# Patient Record
Sex: FEMALE | Race: WHITE | NOT HISPANIC OR LATINO | Employment: FULL TIME | URBAN - METROPOLITAN AREA
[De-identification: names, ages, dates, MRNs, and addresses within clinical notes are randomized per-mention and may not be internally consistent; named-entity substitution may affect disease eponyms.]

---

## 2021-12-09 ENCOUNTER — TRANSFERRED RECORDS (OUTPATIENT)
Dept: HEALTH INFORMATION MANAGEMENT | Facility: CLINIC | Age: 36
End: 2021-12-09

## 2022-10-26 ENCOUNTER — TELEPHONE (OUTPATIENT)
Dept: ENDOCRINOLOGY | Facility: CLINIC | Age: 37
End: 2022-10-26

## 2022-11-01 ENCOUNTER — TELEPHONE (OUTPATIENT)
Dept: ENDOCRINOLOGY | Facility: CLINIC | Age: 37
End: 2022-11-01

## 2022-11-01 NOTE — TELEPHONE ENCOUNTER
"Patient interested in balloon. Read about it on [employer] and [employer] websites.  Ht 5'1\", 199 lbs, BMU 37.6.  Had gestational diabetes, pre-diabetes now. Snores and tired during the day, potential BRANDT.  Unsure about cost and self-pay. Has [employer].  Discussed sleeve gastrectomy and anti-obesity medication options.  Plan:  Patient to call Call Center and schedule the followin. New Bariatric Surgery Consult  2. New NBS Nurse Class ( NEW CONSULT WEIGHT LOSS SURGERY CLASS)  3. Dietitian Class ( WLS NUTRITION CLASS)  "

## 2022-11-22 RX ORDER — BETAMETHASONE VALERATE 1.2 MG/G
AEROSOL, FOAM TOPICAL
COMMUNITY
Start: 2022-05-27

## 2022-11-22 RX ORDER — LORAZEPAM 0.5 MG/1
1 TABLET ORAL EVERY 6 HOURS
COMMUNITY
Start: 2021-09-12

## 2022-11-22 RX ORDER — ACETAMINOPHEN 500 MG
1000 TABLET ORAL
COMMUNITY
Start: 2021-09-14 | End: 2022-11-23

## 2022-11-22 RX ORDER — IBUPROFEN 600 MG/1
600 TABLET, FILM COATED ORAL
COMMUNITY
Start: 2021-09-14 | End: 2022-11-23

## 2022-11-22 NOTE — PROGRESS NOTES
"Virtual Visit Check-In    During this virtual visit the patient is located in [place], patient verifies this as the location during the entirety of this visit.     Jane is a 37 year old who is being evaluated via a billable video visit.      How would you like to obtain your AVS? MyChart  If the video visit is dropped, the invitation should be resent by: Text to cell phone: [phone]  Will anyone else be joining your video visit? No        Video-Visit Details    Video Start Time: 9:59AM    Type of service:  Video Visit    Video End Time:11:06AM    Originating Location (pt. Location): Home        Distant Location (provider location):  Off-site    Platform used for Video Visit: [employer]       70 minutes spent on the date of the encounter doing chart review, history and exam, documentation and further activities per the note    New Bariatric Surgery Consultation Note    2022    RE: Jane Phan  MR#: [id number]  : 1985      Referring provider: No flowsheet data found.    Chief Complaint/Reason for visit: evaluation for possible weight loss surgery    Dear No primary care provider on file.,    I had the pleasure of seeing your patient, Jane Phan, to evaluate her obesity and consider her for possible weight loss surgery. As you know, Jane Phan is 37 year old.  She has a height of 5' 1\", a weight of 198 lbs 0 oz, and calculated Body mass index is 37.41 kg/m .    Assessment & Plan   Problem List Items Addressed This Visit        Digestive    Gastroesophageal reflux disease without esophagitis     Symptoms not well controlled on Omeprazole 20mg every day. Increased today to 20mg twice daily. Can consider increase dosing if still no relief.     EGD ordered.          Relevant Medications    omeprazole (PRILOSEC) 20 MG DR capsule    Other Relevant Orders    Adult GI  Referral - Procedure Only    Class 2 severe obesity with serious comorbidity and body mass index (BMI) of 37.0 to 37.9 in adult " (H)     Obesity onset in puberty. Gradual weight gain through pregnancies, stress and depression. Has been able to lose weight, but not sustain weight loss. Struggles most with increase hunger and cravings.     Weight today - 198lbs    Discussed AOMs:   - Phentermine - concern for history of pericarditis in pregnancy.   - Topiramate - can be considered in future  - Naltrexone - can be considered in future   - GLP-1 to be started today. Best fit for hunger control and new diagnosis of pre-diabetes. Reviewed side effects and benefits. Will start Saxenda. If not covered consider ozempic based on lab resultts.          Relevant Medications    liraglutide - Weight Management (SAXENDA) 18 MG/3ML pen    insulin pen needle (31G X 5 MM) 31G X 5 MM miscellaneous    Other Relevant Orders    Adult Sleep Eval & Management  Referral    CBC with platelets    Comprehensive metabolic panel    Hemoglobin A1c    Lipid panel reflex to direct LDL Fasting    Parathyroid Hormone Intact    Vitamin D Deficiency    Adult Cardiology Eval  Referral       Endocrine    Pre-diabetes    Relevant Medications    liraglutide - Weight Management (SAXENDA) 18 MG/3ML pen    insulin pen needle (31G X 5 MM) 31G X 5 MM miscellaneous   Other Visit Diagnoses     Snoring    -  Primary    Relevant Orders    Adult Sleep Eval & Management  Referral    History of pericarditis during pregnancy        Relevant Orders    Adult Cardiology Eval  Referral         1. Start Saxenda ramp up. Will consider ozempic after lab results. If needed due to coverage, topiramate.    2. Increase omeprazole to 20mg twice daily to help with GERD control   3. Labs have been faxed.   4. Schedule EGD with Dr. Willy Minor.   5. Letters of support/Clearance:    - PCP   - Cardiology - referral placed   - Sleep - referral placed   6. Schedule Psych Eval  7. Complete 3 dietitian visits, and then monthly until surgery   8. Dr. Minor visit in 2 months   9.  "Follow up with Margy Ham in 1 month   10. Reach out to Jose for insurance coverage confirmation    HISTORY OF PRESENT ILLNESS:  Weight Loss History Reviewed with Patient 11/22/2022   How long have you been overweight? Since puberty   What is the most that you have ever weighed? 202   What is the most weight you have lost? 35   I have tried the following methods to lose weight Watching portions or calories, Exercise, Weight Watchers, Atkins type diet (low carb/high protein), Pre packaged meals ex: Nutrisystem, OTC Medications   I have tried the following weight loss medications? (Check all that apply) None   Have you ever had weight loss surgery? No     Obesity onset in puberty. Has gained weight through increased stress, depression, and 3 pregnancies. Most weight she has lost is 35lbs, but hard to sustain weight loss. Tried vegan diet previously - felt increased energy but no weight loss. Felt best at 145lbs. Will have very painful menstral cycles, and lead to increase hunger. Has had friends and family go through surgery. Wants to go surgery for increased energy for children.      Weight today - 198lbs     Eating 3 meals a day, with 2 snacks. Snacks more through work. Feels constantly hungery. Has heartburn, will have bread to help with it. Can get full, but not stay full. Struggles with feeling satisfied. Craves salt. Puts \"butter on everything\".   Breakfast - breakfast sandwich, spaghetti-os  Lunch - sandwich, pre-packaged meals, left overs, snacks   Dinner - pasta, meat or potatoes, soup - will feed kids first, will not eat till 7:30PM   Snacks - sunflower seeds, radishes, chips, vegetables   Drinks - Monster energy drink, crystal light  ETOH - Truly seltzer nightly     Activity - Has been difficult. Has no motivation and increase fatigue. Struggles with activity with kids, will get tired easily with short of breath.     Tried OTC weight loss medications - but concerned it wasn't healthy.     CO-MORBIDITIES OF " OBESITY INCLUDE:     11/22/2022   I have the following health issues associated with obesity: Pre-Diabetes, GERD (Reflux)     Pre-DM - had gestational diabetes. Recent A1C showed pre-diabetes.     GERD - Symptoms daily of chest pain and coughing. Takes Prilosec 20mg once daily, relief during the day but not at night. No EGD. Denies vomiting, melena or blood in stool. Has had it 10+ years.     Headache - daily that can turn into migraine w/ aura. Takes Excedrin, bath and cold compress. Has tried medications in the past, but unsure what.     Pericarditis - when pregnant. But has no other concerns.     History of bleeding or clotting disorder? No    Is patient on biologics or immunomodulators? No    PAST MEDICAL HISTORY:  No past medical history on file.    PAST SURGICAL HISTORY:  No past surgical history on file.  Csection   Rhinoplasty   No other abdominal surgries.     FAMILY HISTORY:   No family history on file.    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 Yes   Who do you have in your support network that can be available to help you for the first 2 weeks after surgery? My , my in-laws, my mom, my kids   Who can you count on for support throughout your weight loss surgery journey? My , my in-laws, my mom, my kids   Can you afford 3 meals a day?  Yes   Can you afford 50-60 dollars a month for vitamins? Yes     Work as a city administer for town.   Very supportive family    HABITS:     11/22/2022   How often do you drink alcohol? 4 or more times a week   If you do drink alcohol, how many drinks might you have in a day? (one drink = 5 oz. wine, 1 can/bottle of beer, 1 shot liquor) 1 or 2   Have you ever used any of  the following nicotine products? No   Have you or are you currently using street drugs or prescription strength medication for which you do not have a prescription for? No   Do you have a history of chemical dependency (alcohol or drug abuse)? No     Currently taking narcotic/opioids No    PSYCHOLOGICAL HISTORY:   Psychological History Reviewed With Patient 11/22/2022   Have you ever attempted suicide? Never.   Have you had thoughts of suicide in the past year? No   Have you ever been hospitalized for mental illness or a suicide attempt? Never.   Do you have a history of chronic pain? Yes   Have you ever been diagnosed with fibromyalgia? No   Are you currently seeing a therapist or counselor?  No   Are you currently seeing a psychiatrist? No     Anxiety/depression - Mood has been stable. Not currently taking medications. Is seeing a  in December to help organize life.     ROS:     11/22/2022   Skin:  Skin fold rashes (groin or other folds)   HEENT: Headaches, Dizziness/lightheadedness   Musculoskeletal: Back pain   Cardiovascular: None of the above   Pulmonary: Shortness of breath with activity, Snoring, Experience morning headaches   Gastrointestinal: Heartburn, Reflux   Genitourinary: Stress incontinence (losing urine when coughing, sneezing, etc.)   Hematological: Family history of blood clots   Neurological: Migraine headaches   Female only: Excessive menstrual bleeding, Irregular menstrual cycles     Snoring, daytime drowsiness, does not wake up rested. No history of BRANDT.     EATING BEHAVIORS:     11/22/2022   Have you or anyone else thought that you had an eating disorder? No   Do you currently binge eat (eat a large amount of food in a short time)? No   Are you an emotional eater? Yes   Do you get up to eat after falling asleep? No       EXERCISE:     11/22/2022   How often do you exercise? Less than 1 time per week   What is the duration of your exercise (in minutes)? 20 Minutes   What types of  exercise do you do? walking   What keeps you from being more active?  Pain, Too tired       MEDICATIONS:  Current Outpatient Medications   Medication Sig Dispense Refill     aspirin-acetaminophen-caffeine (EXCEDRIN MIGRAINE) 250-250-65 MG tablet Take 1 tablet by mouth       betamethasone valerate 0.12 % FOAM APPLY TOPICALLY TWICE A DAY AS NEEDED FOR ITCHY SCALP; USE AFTER KETOCONAZOLE SHAMPOO       insulin pen needle (31G X 5 MM) 31G X 5 MM miscellaneous Use Saxenda pen needles daily or as directed. 100 each 1     liraglutide - Weight Management (SAXENDA) 18 MG/3ML pen Week 1: 0.6 mg subcutaneous daily, Week 2: 1.2 mg daily, Week 3: 1.8 mg daily, Week 4: 2.4 mg daily, Week 5 & on: 3 mg daily 15 mL 1     LORazepam (ATIVAN) 0.5 MG tablet Take 1 tablet by mouth every 6 hours       omeprazole (PRILOSEC) 20 MG DR capsule Take 1 capsule (20 mg) by mouth 2 times daily 60 capsule 3       ALLERGIES:  Allergies   Allergen Reactions     Doxycycline Rash     OHC Reaction: Rash; [facility] Noted: [id number]               Anti-obesity medication ROS:    HEENT  Hx of glaucoma: No    Cardiovascular  CAD:Yes  HTN:No    Gastrointestinal  GERD:Yes  Constipation:No  Liver Dz:No  H/O Pancreatitis:No    Psychiatric  Bipolar: No  Anxiety:Yes  Depression:Yes  History of alcohol/drug abuse: No  Hx of eating disorder:No    Endocrine  Personal or family hx of MTC or MEN2:No  Diabetes/prediabetes: Yes    Neurologic:  Hx of seizures: No  Hx of migraines: Yes  Memory Impairment: No      History of kidney stones: No  Kidney disease: No  Current birth control: Salpingectomy        Objective           Vitals:  No vitals were obtained today due to virtual visit.    Physical Exam         In summary, Jane Phan has Class III obesity with a body mass index of Body mass index is 37.41 kg/m . kg/m2 and the comorbidities stated above. She completed an informational seminar and is a possible candidate for the laparoscopic gastric sleeve.  She will have to  complete the following pre-requisites:    Received weight loss goal of 10 lb prior to surgery.  Achieve clearance from dietitian to see surgeon.  Have preoperative laboratory tests drawn.  Psychological Evaluation with MMPI and clearance for weight loss surgery.  Letter of clearance from the following PCP, cardiology, sleep.    Today in the office we discussed gastric sleeve surgery. Preoperative, perioperative, and postoperative processes, management, and follow up were addressed.  Risks and benefits were outlined including the risk of death, staple line leak (1-2%), PE, DVT, ulcer, worsening GERD, N/V, stricture, hernia, wound infection, weight regain, and vitamin deficiencies. I emphasized exercise and activity along with appropriate food choice as the main foundation for weight loss with surgery providing surgical reinforcement of this.  All questions were answered.  A goal sheet and support group handout were given to the patient.        If you have not already watched our online seminar please go to [email or web address]    Weight loss requirement: 10lbs prior to surgery. Will have final weight check 2-3 weeks prior to surgery at anesthesia or nurse pre-op teaching visit.    -Need current weight confirmation at primary clinic or weight management clinic Yes    Bariatric labs ordered, call for a lab only appointment at any [facility] lab. To find a lab location near you, please call [phone]. Please let us know if orders need to be faxed to a non [facility] lab.    Schedule bariatric psych eval as soon as possible.  List of psychologists will be sent to you via [employer] or given to you in clinic.     Call Jose Daley at [phone] to discuss insurance coverage for bariatric surgery.  Please check with your insurance regarding bariatric surgery coverage also. Jose can also help you with scheduling psych eval if you are having difficulties.    The following clearance letters are  needed: Letter templates will be sent to you via [employer] or given to you in clinic. Providers can submit through electronic medical record or fax to [phone].  - Letter of support from primary care provider.   - Sleep   - Cardiology     Smoking cessation and nicotine test needed: No    Birth control after surgery discussed. Patient instructed that 2 forms of birth control required after surgery and to avoid pregnancy for at least 18 months after surgery: Salpingectomy    NEXT VISITS: A  should reach out to you to schedule the following appointments.  If they do not reach you please call [phone] to schedule the following appointments:    -See dietitian in 1 month and monthly for 3 months    -See Margo Ham in 1 months to follow up on pre-op weight loss and weight loss medications    -See Dr Minro in 2 months for bariatric surgeon visit. Discuss bariatric surgery.  Discuss EGD results        Once the patient has completed the requirements in their task list and there are no further recommendations, the pt will be allowed to see the surgeon of their choice for consultation on the laparoscopic gastric sleeve surgery. Patient verbalizes understanding of the process to surgery and expectations for the postoperative period including the need for lifelong lifestyle changes, vitamin supplementation, and laboratory monitoring.    Sincerely,     MARGO RODRIGUEZ PA-C

## 2022-11-23 ENCOUNTER — VIRTUAL VISIT (OUTPATIENT)
Dept: ENDOCRINOLOGY | Facility: CLINIC | Age: 37
End: 2022-11-23
Payer: COMMERCIAL

## 2022-11-23 ENCOUNTER — TELEPHONE (OUTPATIENT)
Dept: ENDOCRINOLOGY | Facility: CLINIC | Age: 37
End: 2022-11-23

## 2022-11-23 VITALS — BODY MASS INDEX: 37.38 KG/M2 | WEIGHT: 198 LBS | HEIGHT: 61 IN

## 2022-11-23 DIAGNOSIS — Z87.59: ICD-10-CM

## 2022-11-23 DIAGNOSIS — E66.01 CLASS 2 SEVERE OBESITY WITH SERIOUS COMORBIDITY AND BODY MASS INDEX (BMI) OF 37.0 TO 37.9 IN ADULT, UNSPECIFIED OBESITY TYPE (H): ICD-10-CM

## 2022-11-23 DIAGNOSIS — R73.03 PRE-DIABETES: ICD-10-CM

## 2022-11-23 DIAGNOSIS — K21.9 GASTROESOPHAGEAL REFLUX DISEASE WITHOUT ESOPHAGITIS: ICD-10-CM

## 2022-11-23 DIAGNOSIS — R06.83 SNORING: Primary | ICD-10-CM

## 2022-11-23 DIAGNOSIS — Z86.79: ICD-10-CM

## 2022-11-23 DIAGNOSIS — E66.812 CLASS 2 SEVERE OBESITY WITH SERIOUS COMORBIDITY AND BODY MASS INDEX (BMI) OF 37.0 TO 37.9 IN ADULT, UNSPECIFIED OBESITY TYPE (H): ICD-10-CM

## 2022-11-23 PROBLEM — F41.8 DEPRESSION WITH ANXIETY: Status: ACTIVE | Noted: 2017-03-28

## 2022-11-23 PROBLEM — G43.009 MIGRAINE WITHOUT AURA: Status: ACTIVE | Noted: 2017-03-28

## 2022-11-23 PROBLEM — N83.209 OVARIAN CYST: Status: ACTIVE | Noted: 2022-11-23

## 2022-11-23 PROCEDURE — 99205 OFFICE O/P NEW HI 60 MIN: CPT | Mod: GT

## 2022-11-23 NOTE — TELEPHONE ENCOUNTER
"Prior Authorization Retail Medication Request    Medication/Dose: Saxenda    ICD code (if different than what is on RX):      Previously Tried and Failed: Watching portions or calories, Exercise, Weight Watchers, Atkins type diet (low carb/high protein), Pre packaged meals ex: Nutrisystem, OTC Medications    Rationale: Pre-Diabetes, GERD (Reflux)  Weight loss medication(s) are indicated due to patient having a BMI of at least 30 kg/m2     Estimated body mass index is 37.41 kg/m  as calculated from the following:    Height as of an earlier encounter on 11/23/22: 1.549 m (5' 1\").    Weight as of an earlier encounter on 11/23/22: 89.8 kg (198 lb).    Initial Weight: 198 lbs 0 oz    Insurance Name:    Insurance ID:        Pharmacy Information (if different than what is on RX)  Name: [id number] - [facility] [address]  Phone:  [phone]    "

## 2022-11-23 NOTE — LETTER
Date:November 25, 2022      Patient was self referred, no letter generated. Do not send.        [facility] Health Information

## 2022-11-23 NOTE — Clinical Note
NBS, sleeve, Dr. Minor.   Jose - I am unsure if she qualifies for insurance coverage. Can you call and confirm? I have put in a sleep referral to see if she has BRANDT.  She also needs an EGD. I have put in an order for Dr. Willy Minor. Can you just call them to schedule, or is it best to go through you?   Nani - please send LOS/Clearance, bariatric info, and psych eval. Thank you!

## 2022-11-23 NOTE — LETTER
"2022       RE: Jane Phan  [address]  [place] [address]     Dear Colleague,    Thank you for referring your patient, Jane Phan, to the [facility] [place] [facility]. Please see a copy of my visit note below.    Virtual Visit Check-In    During this virtual visit the patient is located in [place], patient verifies this as the location during the entirety of this visit.     Jane is a 37 year old who is being evaluated via a billable video visit.      How would you like to obtain your AVS? MyChart  If the video visit is dropped, the invitation should be resent by: Text to cell phone: [phone]  Will anyone else be joining your video visit? No        Video-Visit Details    Video Start Time: 9:59AM    Type of service:  Video Visit    Video End Time:11:06AM    Originating Location (pt. Location): Home        Distant Location (provider location):  Off-site    Platform used for Video Visit: GeoVantage       70 minutes spent on the date of the encounter doing chart review, history and exam, documentation and further activities per the note    New Bariatric Surgery Consultation Note    2022    RE: Jane Phan  MR#: [id number]  : 1985      Referring provider: No flowsheet data found.    Chief Complaint/Reason for visit: evaluation for possible weight loss surgery    Dear No primary care provider on file.,    I had the pleasure of seeing your patient, Jane Phan, to evaluate her obesity and consider her for possible weight loss surgery. As you know, Jane Phan is 37 year old.  She has a height of 5' 1\", a weight of 198 lbs 0 oz, and calculated Body mass index is 37.41 kg/m .    Assessment & Plan   Problem List Items Addressed This Visit        Digestive    Gastroesophageal reflux disease without esophagitis     Symptoms not well controlled on Omeprazole 20mg every day. Increased today to 20mg twice " daily. Can consider increase dosing if still no relief.     EGD ordered.          Relevant Medications    omeprazole (PRILOSEC) 20 MG DR capsule    Other Relevant Orders    Adult GI  Referral - Procedure Only    Class 2 severe obesity with serious comorbidity and body mass index (BMI) of 37.0 to 37.9 in adult (H)     Obesity onset in puberty. Gradual weight gain through pregnancies, stress and depression. Has been able to lose weight, but not sustain weight loss. Struggles most with increase hunger and cravings.     Weight today - 198lbs    Discussed AOMs:   - Phentermine - concern for history of pericarditis in pregnancy.   - Topiramate - can be considered in future  - Naltrexone - can be considered in future   - GLP-1 to be started today. Best fit for hunger control and new diagnosis of pre-diabetes. Reviewed side effects and benefits. Will start Saxenda. If not covered consider ozempic based on lab resultts.          Relevant Medications    liraglutide - Weight Management (SAXENDA) 18 MG/3ML pen    insulin pen needle (31G X 5 MM) 31G X 5 MM miscellaneous    Other Relevant Orders    Adult Sleep Eval & Management  Referral    CBC with platelets    Comprehensive metabolic panel    Hemoglobin A1c    Lipid panel reflex to direct LDL Fasting    Parathyroid Hormone Intact    Vitamin D Deficiency    Adult Cardiology Eval  Referral       Endocrine    Pre-diabetes    Relevant Medications    liraglutide - Weight Management (SAXENDA) 18 MG/3ML pen    insulin pen needle (31G X 5 MM) 31G X 5 MM miscellaneous   Other Visit Diagnoses     Snoring    -  Primary    Relevant Orders    Adult Sleep Eval & Management  Referral    History of pericarditis during pregnancy        Relevant Orders    Adult Cardiology Eval  Referral         1. Start Saxenda ramp up. Will consider ozempic after lab results. If needed due to coverage, topiramate.    2. Increase omeprazole to 20mg twice daily to help  "with GERD control   3. Labs have been faxed.   4. Schedule EGD with Dr. Willy Minor.   5. Letters of support/Clearance:    - PCP   - Cardiology - referral placed   - Sleep - referral placed   6. Schedule Psych Eval  7. Complete 3 dietitian visits, and then monthly until surgery   8. Dr. Minor visit in 2 months   9. Follow up with Margy Ham in 1 month   10. Reach out to [facility] for insurance coverage confirmation    HISTORY OF PRESENT ILLNESS:  Weight Loss History Reviewed with Patient 11/22/2022   How long have you been overweight? Since puberty   What is the most that you have ever weighed? 202   What is the most weight you have lost? 35   I have tried the following methods to lose weight Watching portions or calories, Exercise, Weight Watchers, Atkins type diet (low carb/high protein), Pre packaged meals ex: Nutrisystem, OTC Medications   I have tried the following weight loss medications? (Check all that apply) None   Have you ever had weight loss surgery? No     Obesity onset in puberty. Has gained weight through increased stress, depression, and 3 pregnancies. Most weight she has lost is 35lbs, but hard to sustain weight loss. Tried vegan diet previously - felt increased energy but no weight loss. Felt best at 145lbs. Will have very painful menstral cycles, and lead to increase hunger. Has had friends and family go through surgery. Wants to go surgery for increased energy for children.      Weight today - 198lbs     Eating 3 meals a day, with 2 snacks. Snacks more through work. Feels constantly hungery. Has heartburn, will have bread to help with it. Can get full, but not stay full. Struggles with feeling satisfied. Craves salt. Puts \"butter on everything\".   Breakfast - breakfast sandwich, spaghetti-os  Lunch - sandwich, pre-packaged meals, left overs, snacks   Dinner - pasta, meat or potatoes, soup - will feed kids first, will not eat till 7:30PM   Snacks - sunflower seeds, radishes, chips, vegetables   Drinks - " Monster energy drink, crystal light  ETOH - Truly seltzer nightly     Activity - Has been difficult. Has no motivation and increase fatigue. Struggles with activity with kids, will get tired easily with short of breath.     Tried OTC weight loss medications - but concerned it wasn't healthy.     CO-MORBIDITIES OF OBESITY INCLUDE:     11/22/2022   I have the following health issues associated with obesity: Pre-Diabetes, GERD (Reflux)     Pre-DM - had gestational diabetes. Recent A1C showed pre-diabetes.     GERD - Symptoms daily of chest pain and coughing. Takes Prilosec 20mg once daily, relief during the day but not at night. No EGD. Denies vomiting, melena or blood in stool. Has had it 10+ years.     Headache - daily that can turn into migraine w/ aura. Takes Excedrin, bath and cold compress. Has tried medications in the past, but unsure what.     Pericarditis - when pregnant. But has no other concerns.     History of bleeding or clotting disorder? No    Is patient on biologics or immunomodulators? No    PAST MEDICAL HISTORY:  No past medical history on file.    PAST SURGICAL HISTORY:  No past surgical history on file.  Csection   Rhinoplasty   No other abdominal surgries.     FAMILY HISTORY:   No family history on file.    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 Yes   Who do you have in your support network that can be available to help you for the first 2 weeks after surgery? My , my in-laws, my mom, my kids   Who can you count on for support throughout your weight loss surgery journey? My , my in-laws, my mom, my kids   Can you afford 3 meals a day?  Yes   Can you afford 50-60 dollars a month for vitamins? Yes      Work as a city administer for town.   Very supportive family    HABITS:     11/22/2022   How often do you drink alcohol? 4 or more times a week   If you do drink alcohol, how many drinks might you have in a day? (one drink = 5 oz. wine, 1 can/bottle of beer, 1 shot liquor) 1 or 2   Have you ever used any of the following nicotine products? No   Have you or are you currently using street drugs or prescription strength medication for which you do not have a prescription for? No   Do you have a history of chemical dependency (alcohol or drug abuse)? No     Currently taking narcotic/opioids No    PSYCHOLOGICAL HISTORY:   Psychological History Reviewed With Patient 11/22/2022   Have you ever attempted suicide? Never.   Have you had thoughts of suicide in the past year? No   Have you ever been hospitalized for mental illness or a suicide attempt? Never.   Do you have a history of chronic pain? Yes   Have you ever been diagnosed with fibromyalgia? No   Are you currently seeing a therapist or counselor?  No   Are you currently seeing a psychiatrist? No     Anxiety/depression - Mood has been stable. Not currently taking medications. Is seeing a  in December to help organize life.     ROS:     11/22/2022   Skin:  Skin fold rashes (groin or other folds)   HEENT: Headaches, Dizziness/lightheadedness   Musculoskeletal: Back pain   Cardiovascular: None of the above   Pulmonary: Shortness of breath with activity, Snoring, Experience morning headaches   Gastrointestinal: Heartburn, Reflux   Genitourinary: Stress incontinence (losing urine when coughing, sneezing, etc.)   Hematological: Family history of blood clots   Neurological: Migraine headaches   Female only: Excessive menstrual bleeding, Irregular menstrual cycles     Snoring, daytime drowsiness, does not wake up rested. No history of BRANDT.     EATING BEHAVIORS:     11/22/2022   Have you or anyone else thought that you had an eating disorder? No   Do you  currently binge eat (eat a large amount of food in a short time)? No   Are you an emotional eater? Yes   Do you get up to eat after falling asleep? No       EXERCISE:     11/22/2022   How often do you exercise? Less than 1 time per week   What is the duration of your exercise (in minutes)? 20 Minutes   What types of exercise do you do? walking   What keeps you from being more active?  Pain, Too tired       MEDICATIONS:  Current Outpatient Medications   Medication Sig Dispense Refill     aspirin-acetaminophen-caffeine (EXCEDRIN MIGRAINE) 250-250-65 MG tablet Take 1 tablet by mouth       betamethasone valerate 0.12 % FOAM APPLY TOPICALLY TWICE A DAY AS NEEDED FOR ITCHY SCALP; USE AFTER KETOCONAZOLE SHAMPOO       insulin pen needle (31G X 5 MM) 31G X 5 MM miscellaneous Use Saxenda pen needles daily or as directed. 100 each 1     liraglutide - Weight Management (SAXENDA) 18 MG/3ML pen Week 1: 0.6 mg subcutaneous daily, Week 2: 1.2 mg daily, Week 3: 1.8 mg daily, Week 4: 2.4 mg daily, Week 5 & on: 3 mg daily 15 mL 1     LORazepam (ATIVAN) 0.5 MG tablet Take 1 tablet by mouth every 6 hours       omeprazole (PRILOSEC) 20 MG DR capsule Take 1 capsule (20 mg) by mouth 2 times daily 60 capsule 3       ALLERGIES:  Allergies   Allergen Reactions     Doxycycline Rash     OHC Reaction: Rash; [facility] Noted: [id number]               Anti-obesity medication ROS:    HEENT  Hx of glaucoma: No    Cardiovascular  CAD:Yes  HTN:No    Gastrointestinal  GERD:Yes  Constipation:No  Liver Dz:No  H/O Pancreatitis:No    Psychiatric  Bipolar: No  Anxiety:Yes  Depression:Yes  History of alcohol/drug abuse: No  Hx of eating disorder:No    Endocrine  Personal or family hx of MTC or MEN2:No  Diabetes/prediabetes: Yes    Neurologic:  Hx of seizures: No  Hx of migraines: Yes  Memory Impairment: No      History of kidney stones: No  Kidney disease: No  Current birth control: Salpingectomy        Objective            Vitals:  No vitals were obtained today  due to virtual visit.    Physical Exam         In summary, Jane Phan has Class III obesity with a body mass index of Body mass index is 37.41 kg/m . kg/m2 and the comorbidities stated above. She completed an informational seminar and is a possible candidate for the laparoscopic gastric sleeve.  She will have to complete the following pre-requisites:    Received weight loss goal of 10 lb prior to surgery.  Achieve clearance from dietitian to see surgeon.  Have preoperative laboratory tests drawn.  Psychological Evaluation with MMPI and clearance for weight loss surgery.  Letter of clearance from the following PCP, cardiology, sleep.    Today in the office we discussed gastric sleeve surgery. Preoperative, perioperative, and postoperative processes, management, and follow up were addressed.  Risks and benefits were outlined including the risk of death, staple line leak (1-2%), PE, DVT, ulcer, worsening GERD, N/V, stricture, hernia, wound infection, weight regain, and vitamin deficiencies. I emphasized exercise and activity along with appropriate food choice as the main foundation for weight loss with surgery providing surgical reinforcement of this.  All questions were answered.  A goal sheet and support group handout were given to the patient.        If you have not already watched our online seminar please go to [email or web address]    Weight loss requirement: 10lbs prior to surgery. Will have final weight check 2-3 weeks prior to surgery at anesthesia or nurse pre-op teaching visit.    -Need current weight confirmation at primary clinic or weight management clinic Yes    Bariatric labs ordered, call for a lab only appointment at any [facility] lab. To find a lab location near you, please call [phone]. Please let us know if orders need to be faxed to a non [facility] lab.    Schedule bariatric psych eval as soon as possible.  List of psychologists will be sent to you via [employer]  or given to you in clinic.     Call Jose Daley at [phone] to discuss insurance coverage for bariatric surgery.  Please check with your insurance regarding bariatric surgery coverage also. Jose can also help you with scheduling psych eval if you are having difficulties.    The following clearance letters are needed: Letter templates will be sent to you via [employer] or given to you in clinic. Providers can submit through electronic medical record or fax to [phone].  - Letter of support from primary care provider.   - Sleep   - Cardiology     Smoking cessation and nicotine test needed: No    Birth control after surgery discussed. Patient instructed that 2 forms of birth control required after surgery and to avoid pregnancy for at least 18 months after surgery: Salpingectomy    NEXT VISITS: A  should reach out to you to schedule the following appointments.  If they do not reach you please call [phone] to schedule the following appointments:    -See dietitian in 1 month and monthly for 3 months    -See Margo Ham in 1 months to follow up on pre-op weight loss and weight loss medications    -See Dr Minor in 2 months for bariatric surgeon visit. Discuss bariatric surgery.  Discuss EGD results        Once the patient has completed the requirements in their task list and there are no further recommendations, the pt will be allowed to see the surgeon of their choice for consultation on the laparoscopic gastric sleeve surgery. Patient verbalizes understanding of the process to surgery and expectations for the postoperative period including the need for lifelong lifestyle changes, vitamin supplementation, and laboratory monitoring.    Sincerely,     MARGO RODRIGUEZ PA-C        Faxed lab orders to [facility], fax number: [phone].    Nani Bates, EMT        Again, thank you for allowing me to participate in the care of your patient.      Sincerely,    MARGO RODRIGUEZ PA-C

## 2022-11-24 NOTE — ASSESSMENT & PLAN NOTE
Obesity onset in puberty. Gradual weight gain through pregnancies, stress and depression. Has been able to lose weight, but not sustain weight loss. Struggles most with increase hunger and cravings.     Weight today - 198lbs    Discussed AOMs:   - Phentermine - concern for history of pericarditis in pregnancy.   - Topiramate - can be considered in future  - Naltrexone - can be considered in future   - GLP-1 to be started today. Best fit for hunger control and new diagnosis of pre-diabetes. Reviewed side effects and benefits. Will start Saxenda. If not covered consider ozempic based on lab resultts.

## 2022-11-24 NOTE — PATIENT INSTRUCTIONS
"Thank you for allowing us the privilege of caring for you. We hope we provided you with the excellent service you deserve.   Please let us know if there is anything else we can do for you so that we can be sure you are completely satisfied with your care experience.    To ensure the quality of our services you may be receiving a patient satisfaction survey from an independent patient satisfaction monitoring company.    The greatest compliment you can give is a \"Likely to Recommend\"    Your visit was with MARGO RODRIGUEZ PA-C today.    Instructions per today's visit:     Matthew Phan, it was great to visit with you today.  Here is a review of our visit.  If our clinic scheduler is not able to reach you please call [phone] to schedule your next appointments.    Start Saxenda. If not covered, will consider Ozempic based on labs or Topiramate.     EGD has bee ordered with Dr. Willy Minor. Endoscopy Instructions:Call the Endoscopy Department at [phone] to schedule.    Weight loss requirement: 10lbs prior to surgery. Will have final weight check 2-3 weeks prior to surgery at anesthesia or nurse pre-op teaching visit.    -Need current weight confirmation at primary clinic or weight management clinic Yes    Bariatric labs ordered, call for a lab only appointment at any [facility] lab. To find a lab location near you, please call [phone]. Please let us know if orders need to be faxed to a non [facility] lab.    Schedule bariatric psych eval as soon as possible.  List of psychologists will be sent to you via [employer] or given to you in clinic.     Call Jose Daley at [phone] to discuss insurance coverage for bariatric surgery.  Please check with your insurance regarding bariatric surgery coverage also. Jose can also help you with scheduling psych eval if you are having difficulties.    The following clearance letters are needed: Letter templates will be sent to you via [employer] or given to you " in clinic. Providers can submit through electronic medical record or fax to [phone].  - Letter of support from primary care provider.   - Sleep   - Cardiology     Smoking cessation and nicotine test needed: No    Birth control after surgery discussed. Patient instructed that 2 forms of birth control required after surgery and to avoid pregnancy for at least 18 months after surgery: Salpingectomy    NEXT VISITS: A  should reach out to you to schedule the following appointments.  If they do not reach you please call [phone] to schedule the following appointments:    -See dietitian in 1 month and monthly for 3 months    -See Margy Ham in 1 months to follow up on pre-op weight loss and weight loss medications    -See Dr Minor in 2 months for bariatric surgeon visit. Discuss bariatric surgery.  Discuss EGD results      Information about Video Visits with [employer]: video visit information  _________________________________________________________________________________________________________________________________________________________  If you are asked by your clinic team to have your blood pressure checked:  [facility] do offer several locations for blood pressure checks. Please follow the below link to schedule an appointment. Scheduling an appointment at the pharmacy for a blood pressure check is now preferred.    Appointment Plus (appointment-plus.Kingspan Wind)  _________________________________________________________________________________________________________________________________________________________  Important contact and scheduling information:  Please call our contact center at [phone] to schedule your next appointments.  To find a lab location near you, please call [phone].  For any nursing questions or concerns call Madeline Hinds LPN at [phone] or Jane Nunn RN at [phone]  Please call during clinic hours Monday through Friday 8:00a - 4:00p if you  have questions or you can contact us via Kiddyt at anytime and we will reply during clinic hours.    Lab results will be communicated through My Chart or letter (if My Chart not used). Please call the clinic if you have not received communication after 1 week or if you have any questions.?  Clinic Fax: [phone]    _________________________________________________________________________________________________________________________________________________________  Meal Replacement Products:    Here is the link to our new e-store where you can purchase our meal replacement products    [employer] E-Store  GliaCure/store    The one week starter kit is a great way to sample a variety of products and see what works for you.    If you want more information about the product go to: Fresh Steps Meals  Conductrics.BIOCUREX    If you are an employee or [facility] or  [employer] please contact your care team for a 10% estore discount    Free Shipping for orders over $75     Benefits of meal replacements products:    Portion and calorie control  Improved nutrition  Structured eating  Simplified food choices  Avoid contact with trigger foods  _________________________________________________________________________________________________________________________________________________________  Interested in working with a health ?  Health coaches work with you to improve your overall health and wellbeing.  They look at the whole person, and may involve discussion of different areas of life, including, but not limited to the four pillars of health (sleep, exercise, nutrition, and stress management). Discuss with your care team if you would like to start working a health .  Health Coaching-3 Pack: Schedule by calling [phone]    $99 for three health coaching visits    Visits may be done in person or via phone    Coaching is a partnership between the  and the  client; Coaches do not prescribe or diagnose    Coaching helps inspire the client to reach his/her personal goals   _________________________________________________________________________________________________________________________________________________________  24 Week Healthy Lifestyle Plan:    Our mission in the 24-week Healthy Lifestyle Plan is to provide you with individualized care by giving you the tools, education and support you need to lose weight and maintain a healthy lifestyle. In your 24-week journey, you ll be supported by a dedicated weight loss team that includes registered dietitians, medical weight management providers, health coaches, and nurses -- all with special expertise in weight loss -- to help you every step of the way.     Monthly meetings with your registered dietician or medical weight management provider help to review your progress, update your care plan, and make any adjustments needed to ensure success. Between these visits, weekly and bi-weekly health  visits will help you focus on the four pillars of weight loss -- stress, sleep, nutrition, and exercise -- and how you can best adapt each to achieve sustainable weight loss results.    In addition, you will be given exclusive access to online wellbeing classes through [employer].  Your initial visit will be with a medical weight management provider who will help to understand your weight loss goals and ensure this program is the right fit for you. Please let our team know if you are interested in the 24 week plan by sending a message to your care team or calling [phone] to schedule.  _________________________________________________________________________________________________________________________________________________________    COMPREHENSIVE WEIGHT MANAGEMENT PROGRAM  VIRTUAL SUPPORT GROUPS    For Support Group Information:      We offer support groups for patients who are working on weight loss and  "considering, preparing for or have had weight loss surgery.   There is no cost for this opportunity.  You are invited to attend the?Virtual Support Groups?provided by any of the following locations:    [facility] via Microsoft Teams with Giulia Ledezma RN  2.   [place] via [place] [address] [place] with Grover Almanzar, PhD, LP  3.   [place] via [place] [address] with Monse Freed RN  4.   [facility] via Humanoid Teams with Monse Stewart Replaced by [facility]    The following Support Group information can also be found on our website:  [email or web address]    [facility] Weight Loss Surgery Support Group    [facility] Weight Loss Surgery Support Group  The support group is a patient-lead forum that meets monthly to share experiences, encouragement and education. It is open to those who have had weight loss surgery, are scheduled for surgery, and those who are considering surgery.   WHEN: This group meets on the 3rd Wednesday of each month from 5:00PM - 6:00PM virtually using Microsoft Teams.   FACILITATOR: Led by Giulia Ledezma RD, LD, RN, the program's Clinical Coordinator.   TO REGISTER: Please contact the clinic via Tello or call the nurse line directly at [phone] to inform our staff that you would like an invite sent to you and to let us know the email you would like the invite sent to. Prior to the meeting, a link with directions on how to join the meeting will be sent to you.    2022 Meetings  Kim 15: \"Let's Talk\" a time for the group to share.  July 20: \"Let's Talk\" a time for the group to share.  August 17: \"Let's Talk\" a time for the group to share.  September 21: \"Let's Talk\" a time for the group to share.  October 19: Guest Speaker: Dr Mukund Cristina MD Pulmonologist and Sleep Medicine Physician, \"Getting a Good Night's Sleep\".  November 16: \"Let's Talk\" a time for the group to share.  December 21: \"Let's Talk\" a time for the group to " "share.    [facility] Support Groups    Connections: Bariatric Care Support Group?  This is open to all [facility] (and those external to this program) pre- and post- operative bariatric surgery patients as well as their support system.   WHEN: This group meets the 2nd Tuesday of each month from 6:30 PM - 8:00 PM virtually using Microsoft Teams.   FACILITATOR: Led by Grover Almanzar, Ph.D who is a Licensed Psychologist with the [facility] Comprehensive Weight Management Program.   TO REGISTER: Please send an email to Grover Almanzar, Ph.D.,  at?[email or web address]?if you would like an invitation to the group and to learn about using Microsoft Teams.    2022 Meetings June 14: Giselle Carmona RD, LD at [facility], \"Nutritional Labeling\"  July 12 August 2 (Please Note Date Change)  September 13 October 11 November 8 December 13    Connections: Post-Operative Bariatric Surgery Support Group  This is a support group for [facility] bariatric patients (and those external to [facility]) who have had bariatric surgery and are at least 3 months post-surgery.  WHEN: This support group meets the 4th Wednesday of the month from 11:00 AM - 12:00 PM virtually using Microsoft Teams.   FACILITATOR: Led by Certified Bariatric Nurse, Monse Freed RN.   TO REGISTER: Please send an email to Monse at [email or web address] if you would like an invitation to the group and to learn about using Microsoft Teams.    2022 Meetings June 22 July 27 August 24 September 28 October 26 November 23 December 28      [facility] Healthy Lifestyle Virtual Support Group    Healthy Lifestyle Virtual Support Group?  This is 60 minutes of small group guided discussion, support and resources. All are welcome who want a healthy lifestyle.  WHEN: This group meets monthly on a Friday from 12:30 PM - 1:30 PM virtually " "using Microsoft Teams.   FACILITATOR: Led by National Board Certified Health and , Monse Stewart [facility].   TO REGISTER: Please send an email to Monse [email or web address] to receive monthly invites to the group or if you have any questions about having a health .  Prior to the meeting, a link with directions on how to join the meeting will be sent to you.    2022 Meetings  June 24: Monse Stewart [facility], \"Setting Limits and Boundaries\".  Jul 29: Open Forum  August 26: Guest Speaker: Marlen Gomez Registered Dietitian  September 30: Open Forum  October 28th: Guest Speaker: Pooja Baca [facility], Health , \"Gratitude Practices\".  November 18: Guest Speaker: Kaycee Rizvi RD Registered Dietitian, \"Navigating How to Eat around the Holidays\".  December 16: Guest Speaker: Jeanie Charlton [facility], \"Changing Your Relationship with Movement\".    ____________________________________________________________________________________________________________________________________________________________________________  [facility] Get Moving Program  Our team of physical therapists is trained to help you understand and take control of your condition. They will perform a thorough evaluation to determine your ability for activity and develop a customized plan to fit your goals and physical ability.  Scheduling: Unsure if the Get Moving program is right for you? Discuss the program with your medical provider or diabetes educator. You can also call us at [phone] to ask questions or schedule an appointment.   [facility] Get Moving Program  ____________________________________________________________________________________________________________________________________________________________________________  [facility] Diabetes Prevention Program (DPP)  If you have prediabetes and Medicare please contact us via MyChart to learn more about the Diabetes Prevention Program " (DPP)  Program Details:  [facility] offers the year-long Diabetes Prevention Program (DPP). The program helps you to make lifestyle changes that prevent or delay type 2 diabetes by supporting healthy eating, increased physical activity, stress reduction and use of coping skills.   On average, previous [facility] DPP cohorts have lost and maintained at least 5% of their starting weight throughout the program and averaged more than 150 minutes of physical activity per week.  Participants meet weekly for one-hour group sessions over sixteen weeks, every other week for the next 8 weeks, and monthly for the last six months.   A year-long maintenance program is also available for participants who complete the first year.   Location & Cost:   During the COVID-19 Public Health Emergency, the program is offered virtually. When in-person classes can resume, they will be held at [facility].  For people with Medicare, the program is covered in full. A self-pay option will also be available for those with non-Medicare insurance plans.   _________________________________________________________________________________________________________________________________________________________  Bluetooth Scale:    We hope to provide you with high quality virtual healthcare visits while social distancing for COVID-19 is necessary, as well as in the future when virtual visits may be more convenient for you.     Our technology team made it possible for Bluetooth scales to send weight measurements to our electronic medical record. This allows weights from you weighing at home to securely flow into the medical record, which will improve telephone and virtual visits.   Additionally, studies have shown that adults actually lose more weight when their weights are automatically sent to someone else, and also that this process is not stressful for those adults.    Below is a link for purchasing the  scale, with a discount code for our patients. You may call your insurance company to see if they will reimburse you for the cost of the scale, as a piece of durable medical equipment. The scales only go up to a weight of 400 pounds. This is an issue and we are working with the developer on increasing this. We found no scales that go over 400lb that have blue-tooth for connecting to [employer].    Scale to purchase: the [employer]  Body  Scale: [email or web address].ds    Discount Code: We have a discount code for our patients to bring the cost down to $50, Discount code is: [place]_Scale_20%off  _______________________________________________________________________________________________________________________________________________________________________________    To work with a Behavioral Health Psychologist:    Call to schedule:    Shawn Bella - [phone]  Fatimah Parikh - [phone]  Elizabet Hill - [phone]  Camilla Castle - [phone]   Katelynn Monge PhD (cannot accept Medicare) [phone]        Thank you,   [facility] Comprehensive Weight Management Team

## 2022-11-24 NOTE — ASSESSMENT & PLAN NOTE
Symptoms not well controlled on Omeprazole 20mg every day. Increased today to 20mg twice daily. Can consider increase dosing if still no relief.     EGD ordered.

## 2022-11-25 ENCOUNTER — VIRTUAL VISIT (OUTPATIENT)
Dept: ENDOCRINOLOGY | Facility: CLINIC | Age: 37
End: 2022-11-25
Payer: COMMERCIAL

## 2022-11-25 ENCOUNTER — CARE COORDINATION (OUTPATIENT)
Dept: ENDOCRINOLOGY | Facility: CLINIC | Age: 37
End: 2022-11-25

## 2022-11-25 ENCOUNTER — DOCUMENTATION ONLY (OUTPATIENT)
Dept: ENDOCRINOLOGY | Facility: CLINIC | Age: 37
End: 2022-11-25

## 2022-11-25 DIAGNOSIS — Z71.3 NUTRITIONAL COUNSELING: Primary | ICD-10-CM

## 2022-11-25 DIAGNOSIS — E66.9 OBESITY: ICD-10-CM

## 2022-11-25 PROCEDURE — 97802 MEDICAL NUTRITION INDIV IN: CPT | Mod: GT | Performed by: DIETITIAN, REGISTERED

## 2022-11-25 NOTE — PROGRESS NOTES
"aJne Phan is a 37 year old female who is being evaluated via a billable video visit.      The patient has been notified of following:     \"This video visit will be conducted via a call between you and your physician/provider. We have found that certain health care needs can be provided without the need for an in-person physical exam.  This service lets us provide the care you need with a video conversation.  If a prescription is necessary we can send it directly to your pharmacy.  If lab work is needed we can place an order for that and you can then stop by our lab to have the test done at a later time.    Video visits are billed at different rates depending on your insurance coverage.  Please reach out to your insurance provider with any questions.    If during the course of the call the physician/provider feels a video visit is not appropriate, you will not be charged for this service.\"    Patient has given verbal consent for Video visit? Yes  How would you like to obtain your AVS? MyChart  If you are dropped from the video visit, the video invite should be resent to: Text to cell phone: [phone]  Will anyone else be joining your video visit? No  {If patient encounters technical issues they should call [phone]      Video-Visit Details    Type of service:  Video Visit    Video Start Time: 9:00 AM  Video End Time: 9:16 AM    Originating Location (pt. Location): Home    Distant Location (provider location):  Offsite (providers home)      Platform used for Video Visit: Ynsect    During this virtual visit the patient is located in [place], patient verifies this as the location during the entirety of this visit.     New Bariatric Nutrition Consultation Note    Reason For Visit: Nutrition Assessment    Jane Phan is a 37 year old presenting today for new bariatric nutrition consult.   Pt is interested in laparoscopic sleeve gastrectomy with Dr. Escobar expected surgery in [place].  Patient is accompanied by self.  " "This is pt's first of 3 required nutrition visits prior to surgery.     Pt referred by RUPERTO Bill on November 23, 2022.  CO-MORBIDITIES OF OBESITY INCLUDE:       11/22/2022   I have the following health issues associated with obesity: Pre-Diabetes, GERD (Reflux)       SUPPORT:  Support System Reviewed With Patient 11/22/2022   Who do you have in your support network that can be available to help you for the first 2 weeks after surgery? My , my in-laws, my mom, my kids   Who can you count on for support throughout your weight loss surgery journey? My , my in-laws, my mom, my kids       ANTHROPOMETRICS:  Estimated body mass index is 37.41 kg/m  as calculated from the following:    Height as of 11/23/22: 1.549 m (5' 1\").    Weight as of 11/23/22: 89.8 kg (198 lb).     Current weight: 198 lbs     Required weight loss goal pre-op: -10 lbs from initial consult weight (goal weight 188 lbs or less before surgery)       11/22/2022   I have tried the following methods to lose weight Watching portions or calories, Exercise, Weight Watchers, Atkins type diet (low carb/high protein), Pre packaged meals ex: Nutrisystem, OTC Medications       Weight Loss Questions Reviewed With Patient 11/22/2022   How long have you been overweight? Since puberty       SUPPLEMENT INFORMATION:  none    NUTRITION HISTORY:  Per RUPERTO Ramos: Obesity onset in puberty. Gradual weight gain through pregnancies, stress and depression. Has been able to lose weight, but not sustain weight loss. Struggles most with increase hunger and cravings.     Pt states her biggest struggles are emotional eating and using bread to help relieve heartburn symptoms.     Barriers to lifestyle change: motivation, emotional eating, stress eating, strong cravings    Recall Diet Questions Reviewed With Patient 11/22/2022   Describe what you typically consume for breakfast (typical or most recent): breakfast sandwich   Describe what you typically consume " "for lunch (typical or most recent): leftovers from supper; sandwich; ravioli or spaghettios; ramens   Describe what you typically consume for supper (typical or most recent): meat, potatoes, veggie; pasta; pizza; processed foods   Describe what you typically consume as snacks (typical or most recent): veggies, chips, sunflower seeds   How many ounces of water, or other low calorie drinks, do you drink daily (8 oz=1 glass)? 24 oz   How many ounces of caffeine (coffee, tea, pop) do you drink daily (8 oz=1 glass)? 24 oz   How many ounces of carbonated (pop, beer, sparkling water) drinks do you drinky daily (8 oz=1 glass)? 16 oz   How many ounces of juice, pop, sweet tea, sports drinks, protein drinks, other sweetened drinks, do you drink daily (8 oz=1 glass)? 0 oz   How many ounces of milk do you drink daily (8 oz=1 glass) 0 oz   Please indicate the type of milk: 1%   How often do you drink alcohol? 4 or more times a week   If you do drink alcohol, how many drinks might you have in a day? (one drink = 5 oz. wine, 1 can/bottle of beer, 1 shot liquor) 1 or 2   Eating 3 meals a day, with 2 snacks. Snacks more through work. Feels constantly hungery. Has heartburn, will have bread to help with it. Can get full, but not stay full. Struggles with feeling satisfied. Craves salt. Puts \"butter on everything\".     Eating Habits 11/22/2022   Do you have any dietary restrictions? No   Do you currently binge eat (eat a large amount of food in a short time)? No   Are you an emotional eater? Yes   Do you get up to eat after falling asleep? No   What foods do you crave? anything with salt       Dining Out History Reviewed With Patient 11/22/2022   How often do you dine out? Around once a week.   Where do you dine out? (select all that apply) fast food chains, take out   What types of food do you order when you dine out? pasta, pizza, burgers, fries, mashed potatoes, fried foods         EXERCISE:  Has been difficult. Has no motivation " and increase fatigue. Struggles with activity with kids, will get tired easily with short of breath.      2022   How often do you exercise? Less than 1 time per week   What is the duration of your exercise (in minutes)? 20 Minutes   What types of exercise do you do? walking   What keeps you from being more active?  Pain, Too tired       NUTRITION DIAGNOSIS:  Obesity r/t long history of positive energy balance aeb BMI >30 kg/m2.    INTERVENTION:  Intervention Provided/Education Provided on post-op diet guidelines, vitamins/minerals essential post-operatively, GI anatomy of bariatric surgeries, ways to help prepare for post-op diet guidelines pre-operatively, portion/calorie-control, mindful eating and sources of protein.  Patient demonstrates understanding.     Personal barriers to making and continuing required life changes have been identified, and strategies to overcome those barriers have been recommended AND family and social supports have been assessed and strategies to strengthen those supports have been recommended.    Provided pt with list of goals, RD contact information and resources listed below via Unified Inboxt.         Questions Reviewed With Patient 2022   How ready are you to make changes regarding your weight? Number 1 = Not ready at all to make changes up to 10 = very ready. 10   How confident are you that you can change? 1 = Not confident that you will be successful making changes up to 10 = very confident. 10       Expected Engagement: good    GOALS:  Relating To Eatin) Meal prep tips:   2) Track meals, snacks, and emotions in a journal  3) Start a multivitamin containing iron   -Centrum, One Daily, etc.  4) Avoid snacking as able. If snack is needed use lean protein and/or fruit/vegetable. Examples:   - 2 cup popcorn   - 1 cup mixed berries   - 15 almonds, walnuts, cashews   - carrot/celery sticks and 2 tbsp low-fat ranch   - 1 hard boiled egg   - Part-skim mozzarella cheese stick   -  "Low-fat, low-sugar greek yogurt with 1/2 cup berries   - Med apple or pear   - sliced bell peppers with 1/2 cup salsa   - 1/2 cup roasted chickpeas   - sliced cucumbers with vinegar    100 calorie sweets: Smart Sweets, Fiber One desserts, Fit and Active 100 calorie snack sweets at [employer]; Nabisco 100 calorie pre-portioned cookies, sugar-free pudding, sugar-free jello.    Snack Recipes:  - Banana and creamy PB dip (https://www.diabetesfoodhub.org/recipes/sweet-peanut-buttery-dip.html?home-category_id=23)  - Roasted chickpeas (https://www.diabetesfoodhub.org/recipes/roasted-and-spiced-chickpeas.html?home-category_id=23)  - Lemon Raspberry bayron seed pudding (https://www.diabetesfoodhub.org/recipes/lemon-raspberry- bayron-seed-pudding.html?home-category_id=23)  - Black bean hummus with carrot and celery sticks (https://www.diabetesfoodhub.org/recipes/black-bean-hummus.html?home-category_id=23)  - Greek yogurt chocolate mouse (https://www.diabetesfoodhub.org/recipes/greek-yogurt-chocolate-mousse.html?home-category_id=23)   - Broccoli Cheese Bites  (https://www.diabetesfoodhub.org/recipes/broccoli-cheese-bites.html?home-category_id=20)  - Chicken Satay with peanut sauce  (https://www.diabetesfoodhub.org/recipes/blueberry-almond-chicken-salad-lettuce-wraps.html?home-category_id=20)  - Deviled Eggs  (https://www.diabetesfoodhub.org/recipes/devilled-eggs.html?home-category_id=20)      Healthy Recipe Resources:  Books:    \"The Volumetrics Eating Plan\" by Esperanza Capellan, Ph.D.    \"Cooking that Counts\" by editors of [employer]    \"Calorie Smart Meals\" cookbook by Better Homes and Precise Path Robotic[employer] (200-500 calorie meals)    Websites:    www.BPT    www.Professional Aptitude Council.org    https://www.diabetesfoodhub.org/all-recipes.html    https://www.Symbiosis Health.Pet Airways/    Https://www.choosemyplate.gov/myplatekitchen    https://snaped.fns.usda.gov/recipes-menus "     https://www.Funanga/recipes/06739/cuisines-regions//    https://www.D'Shane Services.Mode De Faire/fatou/1501848/dietitian-budget-high-protein-dinner-recipes/    https://www.Shopzilla.org/knowledge-center/recipes/    Apps:    Concept Inbox meghann (or website, mealime.com)     Diet Guidelines after Weight Loss Surgery  http://[email or web address]/698551.pdf     Lifestyle Changes Before and After Weight Loss Surgery: [place] for Success  https://[email or web address]/826471.pdf     Modified Liquid Diet (2 liquid meals, 1 meal, 2 snacks)  https://[email or web address]/810521.pdf     Your Stage 1 Diet: Clear Liquids  http://[email or web address]/182882.pdf     Your Stage 2 Diet: Low-fat Full Liquids  http://[email or web address]/897971.pdf     Your Stage 3 Diet: Pureed Foods  http://[email or web address].com/907218.pdf     Pureed Pleasures  http://rag & bone/213678.pdf    Your Stage 4 Diet: Soft Foods  http://fvfiles.com/603480.pdf    Your Stage 5 Diet: Regular Foods  http://fvfiles.com/364188.pdf    Supplements after Weight Loss Surgery  http://[email or web address].com/299243.pdf   The Plate Method  Http://www.fvfiles.[email or web address]/442053.pdf    Protein Sources for Weight Loss  http://fvfiles.com/840837.pdf     Carbohydrates  http://fvfiles.com/523479.pdf     Mindful Eating  http://rag & bone/995905.pdf     Summary of Volumetrics Eating Plan  http://[email or web address]/774208.pdf     Diet Guidelines after Weight Loss Surgery  http://[email or web address]/674302.pdf     Seated Exercises for Arms and Legs (can be done before or after surgery)  http://www.[email or web address].pdf    Follow up: 1 month, prn     Time spent with patient:  16 minutes.  FACUNDO ARCE RD, PARI

## 2022-11-25 NOTE — LETTER
Date:November 28, 2022      Patient was self referred, no letter generated. Do not send.        [facility] Health Information

## 2022-11-25 NOTE — PATIENT INSTRUCTIONS
Hi Jaen!    Follow-up with RD in 1 month    Thank you,    Marlen Gomez, RD, LD  If you would like to schedule or reschedule an appointment with the RD, please call [phone]    Nutrition Goals  Relating To Eatin) Meal prep tips:   2) Track meals, snacks, and emotions in a journal  3) Start a multivitamin containing iron   -Centrum, One Daily, etc.  4) Avoid snacking as able. If snack is needed use lean protein and/or fruit/vegetable. Examples:   - 2 cup popcorn   - 1 cup mixed berries   - 15 almonds, walnuts, cashews   - carrot/celery sticks and 2 tbsp low-fat ranch   - 1 hard boiled egg   - Part-skim mozzarella cheese stick   - Low-fat, low-sugar greek yogurt with 1/2 cup berries   - Med apple or pear   - sliced bell peppers with 1/2 cup salsa   - 1/2 cup roasted chickpeas   - sliced cucumbers with vinegar    100 calorie sweets: Smart Sweets, Fiber One desserts, Fit and Active 100 calorie snack sweets at [employer]; Nabisco 100 calorie pre-portioned cookies, sugar-free pudding, sugar-free jello.    Snack Recipes:  - Banana and creamy PB dip (https://www.diabetesfoodhub.org/recipes/sweet-peanut-buttery-dip.html?home-category_id=23)  - Roasted chickpeas (https://www.diabetesfoodhub.org/recipes/roasted-and-spiced-chickpeas.html?home-category_id=23)  - Lemon Raspberry bayron seed pudding (https://www.diabetesfoodhub.org/recipes/lemon-raspberry- bayron-seed-pudding.html?home-category_id=23)  - Black bean hummus with carrot and celery sticks (https://www.diabetesfoodhub.org/recipes/black-bean-hummus.html?home-category_id=23)  - Greek yogurt chocolate mouse (https://www.diabetesfoodhub.org/recipes/greek-yogurt-chocolate-mousse.html?home-category_id=23)   - Broccoli Cheese Bites  (https://www.diabetesfoodhub.org/recipes/broccoli-cheese-bites.html?home-category_id=20)  - Chicken Satay with peanut sauce  (https://www.diabetesfoodhub.org/recipes/blueberry-almond-chicken-salad-lettuce-wraps.html?home-category_id=20)  - Deviled  "Eggs  (https://www.diabetesfoodhub.org/recipes/devilled-eggs.html?home-category_id=20)      Healthy Recipe Resources:  Books:  \"The Volumetrics Eating Plan\" by Esperanza Capellan, Ph.D.  \"Cooking that Counts\" by editors of [place] Self Representation Document Preparation  \"Calorie Smart Meals\" cookbook by Better Homes and Gardens (200-500 calorie meals)    Websites:  www.Undo Software  www.Loop Survey  https://www.diabetesfoodhub.org/all-recipes.html  https://ThermalTherapeuticSystems.weeSPIN/  Https://www.Cocrystal Discoveryplate.gov/myplatekitchen  https://snaped.Race Yourselfs.usda.gov/recipes-menus   https://www.MunchAway/recipes/67876/cuisines-regions//  https://www.MunchAway/gallery/9297781/dietitian-budget-high-protein-dinner-recipes/  https://www.Blue Diamond Technologies.[employer]/knowledge-center/recipes/    Apps:  paymio meghann (or website, mealime.com)     Diet Guidelines after Weight Loss Surgery  http://fvfiles.com/229580.pdf     Lifestyle Changes Before and After Weight Loss Surgery: [place] for Success  https://[email or web address]/762068.pdf     Modified Liquid Diet (2 liquid meals, 1 meal, 2 snacks)  https://[email or web address]/198191.pdf     Your Stage 1 Diet: Clear Liquids  http://fvfiles.com/608271.pdf     Your Stage 2 Diet: Low-fat Full Liquids  http://fvfiles.com/255449.pdf     Your Stage 3 Diet: Pureed Foods  http://fvfiles.com/793375.pdf     Pureed Pleasures  http://Vignani/571141.pdf    Your Stage 4 Diet: Soft Foods  http://fvfiles.com/632687.pdf    Your Stage 5 Diet: Regular Foods  http://fvfiles.com/579735.pdf    Supplements after Weight Loss Surgery  http://fvfiles.com/281367.pdf   The Plate Method  Http://www.fvfiles.com/621566.pdf    Protein Sources for Weight Loss  http://fvfiles.com/568768.pdf     Carbohydrates  http://fvfiles.com/784155.pdf     Mindful Eating  http://Vignani/020218.pdf     Summary of Volumetrics Eating Plan  http://fvfiles.com/676849.pdf     Diet Guidelines after Weight Loss Surgery  http://fvfiles.com/766203.pdf     Seated Exercises for Arms and " Legs (can be done before or after surgery)  http://www.[email or web address]/165813.pdf    Interested in working with a health ? Health coaches work with you to improve your overall health and wellbeing. They look at the whole person, and may involve discussion of different areas of life, including, but not limited to the four pillars of health (sleep, exercise, nutrition, and stress management). Discuss with your care team if you would like to start working a health .    Health Coaching-3 Pack:    $99 for three health coaching visits    Visits may be done in person or via phone    Coaching is a partnership between the  and the client; Coaches do not prescribe or diagnose    Coaching helps inspire the client to reach his/her personal goals    COMPREHENSIVE WEIGHT MANAGEMENT PROGRAM  VIRTUAL SUPPORT GROUPS    For Support Group Information:      We offer support groups for patients who are working on weight loss and considering, preparing for or have had weight loss surgery.   There is no cost for this opportunity.  You are invited to attend the?Virtual Support Groups?provided by any of the following locations:    [facility] via Justinmind Teams with Giulia Ledezma RN  2.   [place] via Justinmind Teams with Grover Almanzar, PhD, LP  3.   [place] via [employer] with Monse Freed RN  4.   [facility] via Justinmind Teams with Monse Stewart [facility]    The following Support Group information can also be found on our website:  https://www.[facility].org/treatments/weight-loss-surgery-support-groups      [facility] Weight Loss Surgery Support Group    [facility] Weight Loss Surgery Support Group  The support group is a patient-lead forum that meets monthly to share experiences, encouragement and education. It is open to those who have had weight loss surgery, are scheduled for surgery, and those who are considering surgery.   WHEN: This group meets on the 3rd Wednesday of each  "month from 5:00PM - 6:00PM virtually using Microsoft Teams.   FACILITATOR: Led by Giulia Ledezma, ROBBI, LD, RN, the program's Clinical Coordinator.   TO REGISTER: Please contact the clinic via Codelearnt or call the nurse line directly at [phone] to inform our staff that you would like an invite sent to you and to let us know the email you would like the invite sent to. Prior to the meeting, a link with directions on how to join the meeting will be sent to you.    2022 Meetings  January 19: \"Let's Talk\" a time for the group to share.  February 16: \"Let's Talk\" a time for the group to share.  March 16: Guest Speakers: Psychologists, Chantel Yoon, PhD,LP and Antonella Reinoso PsyD,  April 20: Guest Speaker: Health , Jeanie Charlton, [facility],CHES, [facility]  May 18: Guest Speaker: Dietitian, Osbaldo Yeager, ROBBI, LP  Kim 15: \"Let's Talk\" a time for the group to share.  July 20: \"Let's Talk\" a time for the group to share.  August 17: TBA  September 21: TBA  October 19: Guest Speaker: Dr Mukund Cristina MD Pulmonologist and Sleep Medicine Physician, \"Getting a Good Night's Sleep\".  November 16: TBA  December 21: TBA    [facility] Support Groups    Connections: Bariatric Care Support Group?  This is open to all [facility] (and those external to this program) pre- and post- operative bariatric surgery patients as well as their support system.   WHEN: This group meets the 2nd Tuesday of each month from 6:30 PM - 8:00 PM virtually using Microsoft Teams.   FACILITATOR: Led by Grover Almanzar, Ph.D who is a Licensed Psychologist with the [facility] Comprehensive Weight Management Program.   TO REGISTER: Please send an email to Grover Almanzar, Ph.D.,  at?[email or web address]?if you would like an invitation to the group and to learn about using Microsoft Teams.    2022 Meetings  January 11: Danielle Reinoso, PharmD, Pharmacy Resident at [facility], \"Medications and Bariatric " "Surgery\".  February 8: Open Forum  March 8  April 12  May 10  Kim 14    Connections: Post-Operative Bariatric Surgery Support Group  This is a support group for [facility] bariatric patients (and those external to [facility]) who have had bariatric surgery and are at least 3 months post-surgery.  WHEN: This support group meets the 4th Wednesday of the month from 11:00 AM - 12:00 PM virtually using Microsoft Teams.   FACILITATOR: Led by Certified Bariatric Nurse, Monse Freed RN.   TO REGISTER: Please send an email to Monse at [email or web address] if you would like an invitation to the group and to learn about using Microsoft Teams.    2022 Meetings  January 26  February 23  March 23  April 27  May 25  Kim 22    [facility] Healthy Lifestyle Virtual Support Group    Healthy Lifestyle Virtual Support Group?  This is 60 minutes of small group guided discussion, support and resources. All are welcome who want a healthy lifestyle.  WHEN: This group meets monthly on a Friday from 12:30 PM - 1:30 PM virtually using Microsoft Teams.   FACILITATOR: Led by National Board Certified Health and , Monse Stewart Replaced by [facility].   TO REGISTER: Please send an email to Monse [email or web address] to receive monthly invites to the group or if you have any questions about having a health .  Prior to the meeting, a link with directions on how to join the meeting will be sent to you.    2022 Meetings  January 21: Esperanza Aguilar MS, RN, CIC, CBN, \"Healthy Habits\"  February 25: Open Forum  March 18: \"Setting Limits and Boundaries\"  April 29: Kaycee Rizvi RD, \"Meal Planning Made Easy\"  May 20: Open Forum  June: To be determined                "

## 2022-11-25 NOTE — TELEPHONE ENCOUNTER
RECORDS RECEIVED FROM:   DATE RECEIVED:   NOTES STATUS DETAILS   OFFICE NOTE from referring provider    Internal Cardiac clearance for bariatric surgery   OFFICE NOTE from other cardiologist    N/A    SUMMARY from hospital/ED   N/A    MEDICATION LIST   Internal    DIAGNOSTIC PROCEDURES     EKG   In process    Monitor Reports   N/A    IMAGING (DISC & REPORT)      Echo   N/A    Stress Tests   N/A    Cath   N/A    MRI/MRA   N/A    CT/CTA   N/A      Action 11/25/22   Fax #[phone]   Action Taken Requested EKG 9-12-21    Sent EKG to scanning 12/5

## 2022-11-25 NOTE — LETTER
"11/25/2022       RE: Jane Phan  [address]  [place] [address]     Dear Colleague,    Thank you for referring your patient, Jane Phan, to the [facility] [place] [facility]. Please see a copy of my visit note below.    Jane Phan is a 37 year old female who is being evaluated via a billable video visit.      The patient has been notified of following:     \"This video visit will be conducted via a call between you and your physician/provider. We have found that certain health care needs can be provided without the need for an in-person physical exam.  This service lets us provide the care you need with a video conversation.  If a prescription is necessary we can send it directly to your pharmacy.  If lab work is needed we can place an order for that and you can then stop by our lab to have the test done at a later time.    Video visits are billed at different rates depending on your insurance coverage.  Please reach out to your insurance provider with any questions.    If during the course of the call the physician/provider feels a video visit is not appropriate, you will not be charged for this service.\"    Patient has given verbal consent for Video visit? Yes  How would you like to obtain your AVS? MyChart  If you are dropped from the video visit, the video invite should be resent to: Text to cell phone: [phone]  Will anyone else be joining your video visit? No  {If patient encounters technical issues they should call [phone]      Video-Visit Details    Type of service:  Video Visit    Video Start Time: 9:00 AM  Video End Time: 9:16 AM    Originating Location (pt. Location): Home    Distant Location (provider location):  Offsite (providers home)      Platform used for Video Visit: CasterStats    During this virtual visit the patient is located in [place], patient verifies this as the location during the entirety of " "this visit.     New Bariatric Nutrition Consultation Note    Reason For Visit: Nutrition Assessment    Jane Phan is a 37 year old presenting today for new bariatric nutrition consult.   Pt is interested in laparoscopic sleeve gastrectomy with Dr. Escobar expected surgery in D.  Patient is accompanied by self.  This is pt's first of 3 required nutrition visits prior to surgery.     Pt referred by RUPERTO Bill on November 23, 2022.  CO-MORBIDITIES OF OBESITY INCLUDE:       11/22/2022   I have the following health issues associated with obesity: Pre-Diabetes, GERD (Reflux)       SUPPORT:  Support System Reviewed With Patient 11/22/2022   Who do you have in your support network that can be available to help you for the first 2 weeks after surgery? My , my in-laws, my mom, my kids   Who can you count on for support throughout your weight loss surgery journey? My , my in-laws, my mom, my kids       ANTHROPOMETRICS:  Estimated body mass index is 37.41 kg/m  as calculated from the following:    Height as of 11/23/22: 1.549 m (5' 1\").    Weight as of 11/23/22: 89.8 kg (198 lb).     Current weight: 198 lbs     Required weight loss goal pre-op: -10 lbs from initial consult weight (goal weight 188 lbs or less before surgery)       11/22/2022   I have tried the following methods to lose weight Watching portions or calories, Exercise, Weight Watchers, Atkins type diet (low carb/high protein), Pre packaged meals ex: Nutrisystem, OTC Medications       Weight Loss Questions Reviewed With Patient 11/22/2022   How long have you been overweight? Since puberty       SUPPLEMENT INFORMATION:  none    NUTRITION HISTORY:  Per RUPERTO Ramos: Obesity onset in puberty. Gradual weight gain through pregnancies, stress and depression. Has been able to lose weight, but not sustain weight loss. Struggles most with increase hunger and cravings.     Pt states her biggest struggles are emotional eating and using bread to help " "relieve heartburn symptoms.     Barriers to lifestyle change: motivation, emotional eating, stress eating, strong cravings    Recall Diet Questions Reviewed With Patient 11/22/2022   Describe what you typically consume for breakfast (typical or most recent): breakfast sandwich   Describe what you typically consume for lunch (typical or most recent): leftovers from supper; sandwich; ravioli or spaghettios; ramens   Describe what you typically consume for supper (typical or most recent): meat, potatoes, veggie; pasta; pizza; processed foods   Describe what you typically consume as snacks (typical or most recent): veggies, chips, sunflower seeds   How many ounces of water, or other low calorie drinks, do you drink daily (8 oz=1 glass)? 24 oz   How many ounces of caffeine (coffee, tea, pop) do you drink daily (8 oz=1 glass)? 24 oz   How many ounces of carbonated (pop, beer, sparkling water) drinks do you drinky daily (8 oz=1 glass)? 16 oz   How many ounces of juice, pop, sweet tea, sports drinks, protein drinks, other sweetened drinks, do you drink daily (8 oz=1 glass)? 0 oz   How many ounces of milk do you drink daily (8 oz=1 glass) 0 oz   Please indicate the type of milk: 1%   How often do you drink alcohol? 4 or more times a week   If you do drink alcohol, how many drinks might you have in a day? (one drink = 5 oz. wine, 1 can/bottle of beer, 1 shot liquor) 1 or 2   Eating 3 meals a day, with 2 snacks. Snacks more through work. Feels constantly hungery. Has heartburn, will have bread to help with it. Can get full, but not stay full. Struggles with feeling satisfied. Craves salt. Puts \"butter on everything\".     Eating Habits 11/22/2022   Do you have any dietary restrictions? No   Do you currently binge eat (eat a large amount of food in a short time)? No   Are you an emotional eater? Yes   Do you get up to eat after falling asleep? No   What foods do you crave? anything with salt       Dining Out History Reviewed " With Patient 2022   How often do you dine out? Around once a week.   Where do you dine out? (select all that apply) fast food chains, take out   What types of food do you order when you dine out? pasta, pizza, burgers, fries, mashed potatoes, fried foods         EXERCISE:  Has been difficult. Has no motivation and increase fatigue. Struggles with activity with kids, will get tired easily with short of breath.      2022   How often do you exercise? Less than 1 time per week   What is the duration of your exercise (in minutes)? 20 Minutes   What types of exercise do you do? walking   What keeps you from being more active?  Pain, Too tired       NUTRITION DIAGNOSIS:  Obesity r/t long history of positive energy balance aeb BMI >30 kg/m2.    INTERVENTION:  Intervention Provided/Education Provided on post-op diet guidelines, vitamins/minerals essential post-operatively, GI anatomy of bariatric surgeries, ways to help prepare for post-op diet guidelines pre-operatively, portion/calorie-control, mindful eating and sources of protein.  Patient demonstrates understanding.     Personal barriers to making and continuing required life changes have been identified, and strategies to overcome those barriers have been recommended AND family and social supports have been assessed and strategies to strengthen those supports have been recommended.    Provided pt with list of goals, RD contact information and resources listed below via [employer].         Questions Reviewed With Patient 2022   How ready are you to make changes regarding your weight? Number 1 = Not ready at all to make changes up to 10 = very ready. 10   How confident are you that you can change? 1 = Not confident that you will be successful making changes up to 10 = very confident. 10       Expected Engagement: good    GOALS:  Relating To Eatin) Meal prep tips:   2) Track meals, snacks, and emotions in a journal  3) Start a multivitamin containing  "iron   -Centrum, One Daily, etc.  4) Avoid snacking as able. If snack is needed use lean protein and/or fruit/vegetable. Examples:   - 2 cup popcorn   - 1 cup mixed berries   - 15 almonds, walnuts, cashews   - carrot/celery sticks and 2 tbsp low-fat ranch   - 1 hard boiled egg   - Part-skim mozzarella cheese stick   - Low-fat, low-sugar greek yogurt with 1/2 cup berries   - Med apple or pear   - sliced bell peppers with 1/2 cup salsa   - 1/2 cup roasted chickpeas   - sliced cucumbers with vinegar    100 calorie sweets: Smart Sweets, Fiber One desserts, Fit and Active 100 calorie snack sweets at [employer]; Nabisco 100 calorie pre-portioned cookies, sugar-free pudding, sugar-free jello.    Snack Recipes:  - Banana and creamy PB dip (https://www.diabetesfoodhub.org/recipes/sweet-peanut-buttery-dip.html?home-category_id=23)  - Roasted chickpeas (https://www.diabetesfoodhub.org/recipes/roasted-and-spiced-chickpeas.html?home-category_id=23)  - Lemon Raspberry bayron seed pudding (https://www.diabetesfoodhub.org/recipes/lemon-raspberry- bayron-seed-pudding.html?home-category_id=23)  - Black bean hummus with carrot and celery sticks (https://www.diabetesfoodhub.org/recipes/black-bean-hummus.html?home-category_id=23)  - Greek yogurt chocolate mouse (https://www.diabetesfoodhub.org/recipes/greek-yogurt-chocolate-mousse.html?home-category_id=23)   - Broccoli Cheese Bites  (https://www.diabetesfoodhub.org/recipes/broccoli-cheese-bites.html?home-category_id=20)  - Chicken Satay with peanut sauce  (https://www.diabetesfoodhub.org/recipes/blueberry-almond-chicken-salad-lettuce-wraps.html?home-category_id=20)  - Deviled Eggs  (https://www.diabetesfoodhub.org/recipes/devilled-eggs.html?home-category_id=20)      Healthy Recipe Resources:  Books:    \"The Volumetrics Eating Plan\" by Esperanza Capellan, Ph.D.    \"Cooking that Counts\" by editors of CookingLight    \"Calorie Smart Meals\" cookbook by [employer] (200-500 calorie " meals)    Websites:    www.Lucid Software    www.Handmade Mobile.org    https://www.diabetesfoodhub.org/all-recipes.html    https://www.[employer]/    Https://www.Imagine Healthplate.gov/myplatekitchen    https://snaped.GdeSlons.usda.gov/recipes-menus     https://www.Genability/recipes/44740/cuisines-regions//    https://www.Genability/gallery/5727817/dietitian-budget-high-protein-dinner-recipes/    https://www.Chegg.org/knowledge-center/recipes/    Apps:    Aquacue meghann (or website, mealime.com)     Diet Guidelines after Weight Loss Surgery  [email or web address]://[email or web address]     Lifestyle Changes Before and After Weight Loss Surgery: [place] for Success  [email or web address]://[email or web address]     Modified Liquid Diet (2 liquid meals, 1 meal, 2 snacks)  [email or web address]://[email or web address]     Your Stage 1 Diet: Clear Liquids  [email or web address]://[email or web address]     Your Stage 2 Diet: Low-fat Full Liquids  [email or web address]://[email or web address]     Your Stage 3 Diet: Pureed Foods  http://[email or web address].[email or web address]     Pureed Pleasures  http://Nascentric/569033.pdf    Your Stage 4 Diet: Soft Foods  http://[email or web address]/501542.[email or web address]    Your Stage 5 Diet: Regular Foods  http://[email or web address]/532612.pdf    Supplements after Weight Loss Surgery  http://[email or web address]/150980.pdf   The Plate Method  Http://www.[email or web address]/[email or web address]    Protein Sources for Weight Loss  [email or web address]://[email or web address]     Carbohydrates  [email or web address]://[email or web address]     Mindful Eating  [email or web address]://Nascentric/[email or web address]     Summary of Volumetrics Eating Plan  [email or web address]://[email or web address]     Diet Guidelines after Weight Loss Surgery  [email or web address]://[email or web address]     Seated Exercises for Arms and Legs (can be done before or after surgery)  [email or web address]://[email or web address]    Follow up: 1 month, prn     Time spent with patient:  16 minutes.  FACUNDO ARCE RD, LD          Again, thank you for allowing me to participate in the care of your patient.      Sincerely,    FACUNDO SEGOVIA  CLAUDE, RD

## 2022-11-25 NOTE — PROGRESS NOTES
Bariatric Task List updated.  Bariatric information/clearance letters sent to patient via [employer].    Bariatric Task List    Fax:  Please fax all paperwork to: [phone] -     Status:  Is patient a candidate for bariatric surgery?:  patient is a candidate for bariatric surgery -     Cleared to schedule surgeon consult?:  cleared to schedule surgeon consult -     Status:  surgery evaluation in process -     Surgeon: Dr. Minor -     Tentative surgery month/year: TBD -        Insurance: Insurance:  [employer] -      Contact insurance to discuss coverage: Needed -       Cigna: PCP Recommendation and Medical Clearance:    -     HP Referral:    -      Advanced beneficiary notification (ABN) for Medicare patients for RD visits   and surgery:   -      Weight history:   -     Other:    -        Patient Info: Initial Weight:  198lb -     Date of Initial Weight/Height:  11/23/2023 -     Goal Weight (lbs):  188 -     Required Weight Loss:  10 -     Surgery Type:  sleeve gastrectomy -     Multidisciplinary Meeting:    -        Dietician Visits: Structured weight loss required by insurance?:    -     Dietician Visit 1:  Needed - 11/25/22, Margy Peña, [place], [place]   Dietician Visit 2:  Needed -     Dietician Visit 3:  Needed -     Dietician Visit 4:    -     Dietician Visit 5:    -     Dietician Visit 6:    -     Dietician Visit additional:    - Monthly until surgery - [place]   Clearance from dietician to see surgeon?:    -     Dietician Notes:    -        Psychological Evaluation: Psych eval:  Needed - List and letter sent to patient, 11/25/22, [place]   Therapist letter of support:    -     Psychiatrist letter of support:    -     Establish care with therapist:    -     Complete eating disorder evaluation:    -     Letter of clearance from therapist/eating disorder program:    -     Other:    -        Lab Work: Complete Blood Count:  Needed -     Comprehensive Metabolic Panel:  Needed -     Vitamin D:  Needed -    "  PTH:  Needed -     Hgb A1c:  Needed -      Lipids: Needed -      TSH ([facility], [facility], [place]):   -       Ferritin:   -       Folate:   -       Testosterone, Total and Free:   -     Thiamine:   -     Vitamin A:   -     Vitamin B12:   -     Zinc:   -     C-peptide:   -     H. pylori:    -     MRSA (2 swabs, minimum 48 hours apart):   -     Nicotine Testing:    -     Recheck Vitamin D:   -     Other:    -        Consults/ Clearance Sleep Medicine:  Needed - Letter sent to patient, 11/25/22, [place]   Cardiac:  Needed - Letter sent to patient, 11/25/22, [place]   Pain:   -     Dental:    -     Endocrine:    -     Gastroenterology:    -     Vascular Medicine:    -     Hematology:    -     Medical Weight Management:   -     Physical Therapy/Exercise:    -     Nephrology:    -     Neurology:    -     Pulmonology:    -     Rheumatology:    -     Other:    -     Other:    -     Other:    -        Testing: UGI:    -     EGD:  Needed -     Sleep Study:   -     Other:   -     Other:    -        PCP: Establish care with PCP:  Completed -     Follow up with PCP:    -     PCP letter of support:  Needed - Letter sent to patient, 11/25/22, [place]      Stopping Smoking/ Alcohol Use: Quit tobacco use (3 months smoke free)?:    -     Quit date:    -     Quit alcohol use:   -     Quit date:   -     Other:   -     Quit date:   -        Patient Education:  Information Session:  Needed -     Given \"Making your decision\" handout?:  Yes -     Given \"A Roadmap to you Weight Loss Surgery\" handout?: Yes -     Given \"Get Well Loop\" information?: Yes -     Given support group information?:    -     Attended support group?:  Needed -     Support plan in place?:    -     Research consents signed?:    -     Avoid NSAIDS/ Alternate Plan for Pain:   -        Additional Surgery Requirements: Review Coag plan:    -     HgA1c <8:    -     Inpatient pain consult:    -     Final nicotine screen:    -     Dental work complete:    -     Birth control plan:    -     Gallstone " prevention plan (Actigall for 6 months postop):   -     Other:   -     Other:   -        Final Tasks:  Before surgery online class:  Needed -     Before surgery online class website link:  [email or web address]   After surgery online class:  Needed -     After surgery online class website link:  [email or web address]   Nurse visit per clinic:  Needed -     History and Physical per clinic:   -     Final labs per clinic: Needed -     Chest xray per clinic:   -     Electrocardiogram (ECG) per clinic:   -     Other:   -        Notes:   -

## 2022-11-25 NOTE — PROGRESS NOTES
Action 11/25/2022 2:06pm -Anibal   Action Taken Fax request sent to [facility] ([phone]) for med recs.

## 2022-11-28 NOTE — TELEPHONE ENCOUNTER
[employer] does not need a prior auth based on the test claim. Was filled on 11/23/2022. Closing encounter.

## 2022-12-05 ENCOUNTER — TELEPHONE (OUTPATIENT)
Dept: GASTROENTEROLOGY | Facility: CLINIC | Age: 37
End: 2022-12-05

## 2022-12-05 ENCOUNTER — HOSPITAL ENCOUNTER (OUTPATIENT)
Facility: CLINIC | Age: 37
End: 2022-12-05
Attending: SURGERY | Admitting: SURGERY
Payer: COMMERCIAL

## 2022-12-05 DIAGNOSIS — E66.01 CLASS 2 SEVERE OBESITY WITH SERIOUS COMORBIDITY AND BODY MASS INDEX (BMI) OF 37.0 TO 37.9 IN ADULT, UNSPECIFIED OBESITY TYPE (H): Primary | ICD-10-CM

## 2022-12-05 DIAGNOSIS — E66.812 CLASS 2 SEVERE OBESITY WITH SERIOUS COMORBIDITY AND BODY MASS INDEX (BMI) OF 37.0 TO 37.9 IN ADULT, UNSPECIFIED OBESITY TYPE (H): Primary | ICD-10-CM

## 2022-12-05 RX ORDER — TOPIRAMATE 25 MG/1
TABLET, FILM COATED ORAL
Qty: 90 TABLET | Refills: 1 | Status: SHIPPED | OUTPATIENT
Start: 2022-12-05

## 2022-12-05 NOTE — TELEPHONE ENCOUNTER
Screening Questions  BLUE  KIND OF PREP RED  LOCATION [review exclusion criteria] GREEN  SEDATION TYPE        y Are you active on mychart?       Mariana, Margy MATHEWS PA-C Ordering/Referring Provider?        [employer] What type of coverage do you have?      n Have you had a positive covid test in the last 14 days?     37.4 1. BMI  [BMI 40+ - review exclusion criteria]    y  2. Are you able to give consent for your medical care? [IF NO,RN REVIEW]        n  3. Are you taking any prescription pain medications on a routine schedule?      n  3a. EXTENDED PREP What kind of prescription?     n 4. Do you have any chemical dependencies such as alcohol, street drugs, or methadone?    n 5. Do you have any history of post-traumatic stress syndrome, severe anxiety or history of psychosis?      **If yes 3- 5 , please schedule with MAC sedation.**          IF YES TO ANY 6 - 10 - HOSPITAL SETTING ONLY.     n 6.   Do you need assistance transferring?     n 7.   Have you had a heart or lung transplant?    n 8.   Are you currently on dialysis?   n 9.   Do you use daily home oxygen?   n 10. Do you take nitroglycerin?   10a. n If yes, how often?     11. [FEMALES]  n Are you currently pregnant?    11a. n If yes, how many weeks? [ Greater than 12 weeks, OR NEEDED]    n 12. Do you have Pulmonary Hypertension? *NEED PAC APPT AT [facility]*     n 13. [review exclusion criteria]  Do you have any implantable devices in your body (pacemaker, defib, LVAD)?    n 14. In the past 6 months, have you had any heart related issues including cardiomyopathy or heart attack?     14a. n If yes, did it require cardiac stenting if so when?     n 15. Have you had a stroke or Transient ischemic attack (TIA - aka  mini stroke ) within 6 months?      n 16. Do you have mod to severe Obstructive Sleep Apnea?  [Hospital only - Ok at [place]]    n 17. Do you have SEVERE AND UNCONTROLLED asthma? *NEED PAC APPT AT [facility]*     n 18. Are you currently taking any blood thinners?  "    18a. If yes, inform patient to \"follow up w/ ordering provider for bridging instructions.\"    n 19. Do you take the medication Phentermine?    19a. If yes, \"Hold for 7 days before procedure.  Please consult your prescribing provider if you have questions about holding this medication.\"     n  20. Do you have chronic kidney disease?      n  21. Do you have a diagnosis of diabetes?     n  22. On a regular basis do you go 3-5 days between bowel movements?      23. Preferred LOCAL Pharmacy for Pre Prescription    [ LIST ONLY ONE PHARMACY]   [id number] - [place], [place] - [address]        - CLOSING REMINDERS -    Informed patient they will need an adult    Cannot take any type of public or medical transportation alone    Conscious Sedation- Needs  for 6 hours after the procedure       MAC/General-Needs  for 24 hours after procedure    Pre-Procedure Covid test to be completed [[facility] PCR Testing Required]    Confirmed Nurse will call to complete assessment         Additional comments:  sent message to Jose Daley for dates and time for Willy Roa.    Case created in [employer].        "

## 2022-12-27 ENCOUNTER — TELEPHONE (OUTPATIENT)
Dept: GASTROENTEROLOGY | Facility: CLINIC | Age: 37
End: 2022-12-27

## 2022-12-27 NOTE — TELEPHONE ENCOUNTER
Caller: Jane Phan   Reason for Reschedule/Cancellation (please be detailed, any staff messages or encounters to note?):     PER IB MSG -- MD RENATE SAGE UNAVAILABLE       Prior to reschedule please review:    Ordering Provider:Margy Peña PA-C     Sedation per order:MODERATE     Does patient have any ASC Exclusions, please identify?: N       Notes on Cancelled Procedure:  1. Procedure:Lower Endoscopy [Colonoscopy]   2. Date: 02/01/2023   3. Location:[facility]; [address] [place] [address]  4. Surgeon: RENATE CARDENAS         Rescheduled: NO -- LVMTCB -- AWAITING PT CALL BACK TO RESCHEDULE AT [facility] WITH DR ELIANE CARDENAS. ON:     01/11   01/25  02/15     @ [facility] UNDER DR. SRINIVAS SHAIKH.    **case in Team depot**     [place]

## 2022-12-27 NOTE — TELEPHONE ENCOUNTER
----- Message from Jose Daley sent at 12/27/2022  2:41 PM CST -----  Regarding: reschedule endoscopy w/Dr Willy Minor  Staff, Could someone please call Jane to reschedule her endoscopy which is now scheduled with Dr. Willy Minor on 2/1 at 1 p.m. I just saw on his calendar that he will be out that week. We could offer her 1/25 at 1:30, 1/11 at 1 or 2/15 at 1 p.m., this would be in Dr. Escobar's Wed slots. Thanks!

## 2022-12-29 ENCOUNTER — TELEPHONE (OUTPATIENT)
Dept: GASTROENTEROLOGY | Facility: CLINIC | Age: 37
End: 2022-12-29

## 2022-12-29 NOTE — TELEPHONE ENCOUNTER
12/29/2022: 2ND ATT -- TO R/S EGD w/Gracy @ [facility]     Caller: Jane Phan   Reason for Reschedule/Cancellation (please be detailed, any staff messages or encounters to note?):      PER IB MSG -- MD RENATE SAGE UNAVAILABLE         Prior to reschedule please review:    Ordering Provider:Margy Peña PA-C     Sedation per order:MODERATE     Does patient have any ASC Exclusions, please identify?: N         Notes on Cancelled Procedure:  1. Procedure:Lower Endoscopy [Colonoscopy]   2. Date: 02/01/2023   3. Location:[facility]; [address] [place] [address]  4. Surgeon: RENATE CARDENAS            Rescheduled: NO -- LVMTCB -- AWAITING PT CALL BACK TO RESCHEDULE AT [facility] WITH DR ELIANE CARDENAS. ON:      01/11   01/25  02/15      @ [facility] UNDER DR. SRINIVAS SHAIKH.     **case in Team depot**      [place]

## 2023-01-02 NOTE — TELEPHONE ENCOUNTER
01/03/2023: 3RD ATT -- TO R/S EGD w/PATEL @ [facility]      Caller: Jane Phan   Reason for Reschedule/Cancellation (please be detailed, any staff messages or encounters to note?):      PER IB MSG -- MD RENATE SAGE UNAVAILABLE         Prior to reschedule please review:    Ordering Provider:Margy Peña PA-C     Sedation per order:MODERATE     Does patient have any ASC Exclusions, please identify?: N         Notes on Cancelled Procedure:  1. Procedure:Lower Endoscopy [Colonoscopy]   2. Date: 02/01/2023   3. Location:[facility]; [address] [place] [address]  4. Surgeon: RENATE CARDENAS         Rescheduled: YES     Procedure: Upper Endoscopy [EGD]    Date: 01/25/2023    Location:[facility]; [address] [place] [address]    Surgeon: RENATE CARDENAS     Sedation Level Scheduled  MODERATE          Reason for Sedation Level PER ORDER     Prep/Instructions updated and sent: TENISHA FUNK

## 2023-01-10 ENCOUNTER — PRE VISIT (OUTPATIENT)
Dept: CARDIOLOGY | Facility: CLINIC | Age: 38
End: 2023-01-10

## 2023-01-10 ENCOUNTER — TELEPHONE (OUTPATIENT)
Dept: GASTROENTEROLOGY | Facility: CLINIC | Age: 38
End: 2023-01-10

## 2023-01-10 NOTE — TELEPHONE ENCOUNTER
Attempted to contact patient regarding upcoming upper endoscopy (EGD) procedure on 1/25/2023 for pre assessment questions. No answer.     Patients mailbox is full, unable to leave message.    Discuss Covid policy.     Pre op exam scheduled: N/A    Arrival time: 1300    Facility location: [facility]; [address] [place] [id number]    Sedation type: Conscious sedation     Anticoagulants: No    Electronic implanted devices? No    Diabetic? Yes - Patient to hold oral diabetic medications day of procedure    Indication for procedure: uncontrolled GERD    Prep instructions sent via [employer].     Huong Fowler RN  Endoscopy Procedure Pre Assessment RN

## 2023-01-17 NOTE — TELEPHONE ENCOUNTER
Second attempt for pre-assessment prior to upcoming upper endoscopy (EGD)     No answer.  Mailbox is full. Unable to leave voicemail.    Katie Davis, RN  Endoscopy Procedure Pre Assessment RN

## 2023-01-22 RX ORDER — LIDOCAINE 40 MG/G
CREAM TOPICAL
Status: CANCELLED | OUTPATIENT
Start: 2023-01-22

## 2023-01-22 RX ORDER — ONDANSETRON 2 MG/ML
4 INJECTION INTRAMUSCULAR; INTRAVENOUS
Status: CANCELLED | OUTPATIENT
Start: 2023-01-22

## 2023-01-24 NOTE — TELEPHONE ENCOUNTER
3rd attempt    Attempted to contact patient regarding upcoming Upper endoscopy (EGD) procedure on 1.25.23 for pre assessment questions. No answer.     Unable to leave message - VM box full.    [employer] message sent.    Madelien Henry RN  Endoscopy Procedure Pre Assessment RN

## 2023-01-25 RX ORDER — ONDANSETRON 2 MG/ML
4 INJECTION INTRAMUSCULAR; INTRAVENOUS EVERY 6 HOURS PRN
Status: CANCELLED | OUTPATIENT
Start: 2023-01-25

## 2023-01-25 RX ORDER — NALOXONE HYDROCHLORIDE 0.4 MG/ML
0.2 INJECTION, SOLUTION INTRAMUSCULAR; INTRAVENOUS; SUBCUTANEOUS
Status: CANCELLED | OUTPATIENT
Start: 2023-01-25

## 2023-01-25 RX ORDER — FLUMAZENIL 0.1 MG/ML
0.2 INJECTION, SOLUTION INTRAVENOUS
Status: CANCELLED | OUTPATIENT
Start: 2023-01-25 | End: 2023-01-25

## 2023-01-25 RX ORDER — NALOXONE HYDROCHLORIDE 0.4 MG/ML
0.4 INJECTION, SOLUTION INTRAMUSCULAR; INTRAVENOUS; SUBCUTANEOUS
Status: CANCELLED | OUTPATIENT
Start: 2023-01-25

## 2023-01-25 RX ORDER — PROCHLORPERAZINE MALEATE 10 MG
10 TABLET ORAL EVERY 6 HOURS PRN
Status: CANCELLED | OUTPATIENT
Start: 2023-01-25

## 2023-01-25 RX ORDER — ONDANSETRON 4 MG/1
4 TABLET, ORALLY DISINTEGRATING ORAL EVERY 6 HOURS PRN
Status: CANCELLED | OUTPATIENT
Start: 2023-01-25

## 2023-01-27 ENCOUNTER — DOCUMENTATION ONLY (OUTPATIENT)
Dept: OTHER | Age: 38
End: 2023-01-27

## 2023-02-12 ENCOUNTER — HEALTH MAINTENANCE LETTER (OUTPATIENT)
Age: 38
End: 2023-02-12

## 2023-04-12 DIAGNOSIS — K21.9 GASTROESOPHAGEAL REFLUX DISEASE WITHOUT ESOPHAGITIS: ICD-10-CM

## 2023-04-14 NOTE — TELEPHONE ENCOUNTER
omeprazole (PRILOSEC) 20 MG DR capsule  Passed protocol    Virtual Visit    11/23/2022  [facility] [place]   Margy Peña PA-C  Surgery

## 2024-03-10 ENCOUNTER — HEALTH MAINTENANCE LETTER (OUTPATIENT)
Age: 39
End: 2024-03-10

## 2025-03-16 ENCOUNTER — HEALTH MAINTENANCE LETTER (OUTPATIENT)
Age: 40
End: 2025-03-16